# Patient Record
Sex: FEMALE | Race: BLACK OR AFRICAN AMERICAN | NOT HISPANIC OR LATINO | URBAN - METROPOLITAN AREA
[De-identification: names, ages, dates, MRNs, and addresses within clinical notes are randomized per-mention and may not be internally consistent; named-entity substitution may affect disease eponyms.]

---

## 2020-09-15 ENCOUNTER — OFFICE VISIT (OUTPATIENT)
Dept: URBAN - METROPOLITAN AREA CLINIC 25 | Facility: CLINIC | Age: 85
End: 2020-09-15

## 2023-06-04 ENCOUNTER — OUT OF OFFICE VISIT (OUTPATIENT)
Dept: URBAN - METROPOLITAN AREA MEDICAL CENTER 8 | Facility: MEDICAL CENTER | Age: 88
End: 2023-06-04

## 2023-06-04 ENCOUNTER — CLAIMS CREATED FROM THE CLAIM WINDOW (OUTPATIENT)
Dept: URBAN - METROPOLITAN AREA MEDICAL CENTER 8 | Facility: MEDICAL CENTER | Age: 88
End: 2023-06-04
Payer: MEDICARE

## 2023-06-04 DIAGNOSIS — N18.6 ANEMIA DUE TO CHRONIC KIDNEY DISEASE, ON CHRONIC DIALYSIS: ICD-10-CM

## 2023-06-04 DIAGNOSIS — D64.89 ANEMIA DUE TO OTHER CAUSE: ICD-10-CM

## 2023-06-04 DIAGNOSIS — I10 ACCELERATED ESSENTIAL HYPERTENSION: ICD-10-CM

## 2023-06-04 DIAGNOSIS — R19.5 ABNORMAL CONSISTENCY OF STOOL: ICD-10-CM

## 2023-06-04 PROCEDURE — G8427 DOCREV CUR MEDS BY ELIG CLIN: HCPCS | Performed by: INTERNAL MEDICINE

## 2023-06-04 PROCEDURE — 99222 1ST HOSP IP/OBS MODERATE 55: CPT | Performed by: INTERNAL MEDICINE

## 2023-06-05 ENCOUNTER — OUT OF OFFICE VISIT (OUTPATIENT)
Dept: URBAN - METROPOLITAN AREA MEDICAL CENTER 8 | Facility: MEDICAL CENTER | Age: 88
End: 2023-06-05

## 2023-06-05 ENCOUNTER — CLAIMS CREATED FROM THE CLAIM WINDOW (OUTPATIENT)
Dept: URBAN - METROPOLITAN AREA MEDICAL CENTER 8 | Facility: MEDICAL CENTER | Age: 88
End: 2023-06-05
Payer: MEDICARE

## 2023-06-05 DIAGNOSIS — K31.819 ACQUIRED ARTERIOVENOUS MALFORMATION OF DUODENUM: ICD-10-CM

## 2023-06-05 DIAGNOSIS — K92.1 ACUTE MELENA: ICD-10-CM

## 2023-06-05 PROCEDURE — 44369 SMALL BOWEL ENDOSCOPY: CPT | Performed by: INTERNAL MEDICINE

## 2023-06-06 ENCOUNTER — CLAIMS CREATED FROM THE CLAIM WINDOW (OUTPATIENT)
Dept: URBAN - METROPOLITAN AREA MEDICAL CENTER 8 | Facility: MEDICAL CENTER | Age: 88
End: 2023-06-06

## 2023-06-06 ENCOUNTER — CLAIMS CREATED FROM THE CLAIM WINDOW (OUTPATIENT)
Dept: URBAN - METROPOLITAN AREA MEDICAL CENTER 8 | Facility: MEDICAL CENTER | Age: 88
End: 2023-06-06
Payer: MEDICARE

## 2023-06-06 DIAGNOSIS — K92.1 ACUTE MELENA: ICD-10-CM

## 2023-06-06 DIAGNOSIS — K31.819 ACQUIRED ARTERIOVENOUS MALFORMATION OF DUODENUM: ICD-10-CM

## 2023-06-06 DIAGNOSIS — D64.89 ANEMIA DUE TO OTHER CAUSE: ICD-10-CM

## 2023-06-06 PROCEDURE — 99232 SBSQ HOSP IP/OBS MODERATE 35: CPT | Performed by: INTERNAL MEDICINE

## 2023-07-13 ENCOUNTER — OFFICE VISIT (OUTPATIENT)
Dept: URBAN - METROPOLITAN AREA CLINIC 84 | Facility: CLINIC | Age: 88
End: 2023-07-13

## 2023-07-13 RX ORDER — SEVELAMER CARBONATE 800 MG/1
TABLET, FILM COATED ORAL
Qty: 0 | Refills: 0 | COMMUNITY
Start: 1900-01-01

## 2023-07-13 RX ORDER — METOPROLOL SUCCINATE 100 MG/1
TABLET, EXTENDED RELEASE ORAL
Qty: 0 | Refills: 0 | COMMUNITY
Start: 1900-01-01

## 2023-07-13 RX ORDER — VALSARTAN AND HYDROCHLOROTHIAZIDE 80; 12.5 MG/1; MG/1
TABLET, FILM COATED ORAL
Qty: 0 | Refills: 0 | COMMUNITY
Start: 1900-01-01

## 2023-07-13 RX ORDER — LEVOTHYROXINE SODIUM 0.1 MG/1
TABLET ORAL
Qty: 0 | Refills: 0 | COMMUNITY
Start: 1900-01-01

## 2023-07-13 RX ORDER — LOSARTAN POTASSIUM AND HYDROCHLOROTHIAZIDE 100; 25 MG/1; MG/1
TABLET, FILM COATED ORAL
Qty: 0 | Refills: 0 | COMMUNITY
Start: 2016-08-15

## 2024-05-02 ENCOUNTER — DASHBOARD ENCOUNTERS (OUTPATIENT)
Age: 89
End: 2024-05-02

## 2024-05-02 ENCOUNTER — OFFICE VISIT (OUTPATIENT)
Dept: URBAN - METROPOLITAN AREA CLINIC 84 | Facility: CLINIC | Age: 89
End: 2024-05-02
Payer: MEDICARE

## 2024-05-02 VITALS
TEMPERATURE: 98.1 F | BODY MASS INDEX: 24.39 KG/M2 | WEIGHT: 146.4 LBS | HEART RATE: 58 BPM | DIASTOLIC BLOOD PRESSURE: 58 MMHG | SYSTOLIC BLOOD PRESSURE: 186 MMHG | HEIGHT: 65 IN

## 2024-05-02 DIAGNOSIS — K86.9 PANCREATIC LESION: ICD-10-CM

## 2024-05-02 DIAGNOSIS — R93.5 ABNORMAL CT OF THE ABDOMEN: ICD-10-CM

## 2024-05-02 DIAGNOSIS — Q27.30 AVM (ARTERIOVENOUS MALFORMATION): ICD-10-CM

## 2024-05-02 PROBLEM — 169083003: Status: ACTIVE | Noted: 2024-05-02

## 2024-05-02 PROBLEM — 27550009: Status: ACTIVE | Noted: 2024-05-02

## 2024-05-02 PROCEDURE — 99214 OFFICE O/P EST MOD 30 MIN: CPT | Performed by: INTERNAL MEDICINE

## 2024-05-02 RX ORDER — HYDRALAZINE HYDROCHLORIDE 50 MG/1
TABLET ORAL
Qty: 45 TABLET | Status: ACTIVE | COMMUNITY

## 2024-05-02 RX ORDER — METOPROLOL SUCCINATE 100 MG/1
TABLET, EXTENDED RELEASE ORAL
Qty: 0 | Refills: 0 | Status: ACTIVE | COMMUNITY
Start: 1900-01-01

## 2024-05-02 RX ORDER — SEVELAMER CARBONATE 800 MG/1
TABLET, FILM COATED ORAL
Qty: 0 | Refills: 0 | Status: ACTIVE | COMMUNITY
Start: 1900-01-01

## 2024-05-02 RX ORDER — PREDNISOLONE ACETATE 10 MG/ML
INSTILL 1 DROP INTO LEFT EYE FOUR TIMES A DAY 4X/DAY FOR 4 DAYS THEN STOP SUSPENSION/ DROPS OPHTHALMIC
Qty: 5 MILLILITER | Refills: 0 | Status: ACTIVE | COMMUNITY

## 2024-05-02 RX ORDER — CLONIDINE HYDROCHLORIDE 0.2 MG/1
TABLET ORAL
Qty: 270 TABLET | Status: ACTIVE | COMMUNITY

## 2024-05-02 RX ORDER — SODIUM ZIRCONIUM CYCLOSILICATE 5 G/5G
TAKE 1 PACKET BY MOUTH EVERY 7 (SEVEN) DAYS POWDER, FOR SUSPENSION ORAL
Qty: 7 EACH | Refills: 1 | Status: ACTIVE | COMMUNITY

## 2024-05-02 RX ORDER — HYDRALAZINE HYDROCHLORIDE 50 MG/1
TAKE 1 TABLET (50 MG) BY MOUTH IN THE MORNING 1 TABLET IN THE EVENING AND 1 TABLET BEFORE BEDTIME TABLET, FILM COATED ORAL
Qty: 270 EACH | Refills: 0 | Status: ACTIVE | COMMUNITY

## 2024-05-02 RX ORDER — PREDNISOLONE ACETATE 10 MG/ML
SUSPENSION/ DROPS OPHTHALMIC
Qty: 5 MILLILITER | Status: ACTIVE | COMMUNITY

## 2024-05-02 RX ORDER — FOLIC ACID/VIT B COMPLEX AND C 0.8 MG
TAKE 1 TABLET BY MOUTH 1 TIME EACH DAY TABLET ORAL
Qty: 90 EACH | Refills: 0 | Status: ACTIVE | COMMUNITY

## 2024-05-02 RX ORDER — CYCLOSPORINE 0.5 MG/ML
INSTILL 1 DROP INTO BOTH EYES TWICE A DAY EMULSION OPHTHALMIC
Qty: 180 EACH | Refills: 0 | Status: ACTIVE | COMMUNITY

## 2024-05-02 RX ORDER — LEVOTHYROXINE SODIUM 0.1 MG/1
TABLET ORAL
Qty: 0 | Refills: 0 | Status: ACTIVE | COMMUNITY
Start: 1900-01-01

## 2024-05-02 RX ORDER — VALSARTAN 160 MG/1
TAKE 1 TABLET (160 MG TOTAL) BY MOUTH IN THE MORNING AND 1 TABLET (160 MG TOTAL) IN THE EVENING TABLET, FILM COATED ORAL
Qty: 180 EACH | Refills: 0 | Status: ACTIVE | COMMUNITY

## 2024-05-02 RX ORDER — VALSARTAN AND HYDROCHLOROTHIAZIDE 80; 12.5 MG/1; MG/1
TABLET, FILM COATED ORAL
Qty: 0 | Refills: 0 | Status: ACTIVE | COMMUNITY
Start: 1900-01-01

## 2024-05-02 RX ORDER — CARVEDILOL 25 MG/1
TAKE 1 TABLET BY MOUTH EVERY DAY IN THE MORNING AND IN THE EVENING WITH MEALS TABLET, FILM COATED ORAL
Qty: 180 EACH | Refills: 0 | Status: ACTIVE | COMMUNITY

## 2024-05-02 RX ORDER — AMLODIPINE BESYLATE 5 MG/1
TABLET ORAL
Qty: 60 TABLET | Status: ACTIVE | COMMUNITY

## 2024-05-02 RX ORDER — LOSARTAN POTASSIUM AND HYDROCHLOROTHIAZIDE 100; 25 MG/1; MG/1
TABLET, FILM COATED ORAL
Qty: 0 | Refills: 0 | Status: ACTIVE | COMMUNITY
Start: 2016-08-15

## 2024-07-25 ENCOUNTER — TELEPHONE ENCOUNTER (OUTPATIENT)
Dept: URBAN - METROPOLITAN AREA CLINIC 84 | Facility: CLINIC | Age: 89
End: 2024-07-25

## 2024-08-22 ENCOUNTER — TELEPHONE ENCOUNTER (OUTPATIENT)
Dept: URBAN - METROPOLITAN AREA CLINIC 84 | Facility: CLINIC | Age: 89
End: 2024-08-22

## 2024-09-12 ENCOUNTER — OFFICE VISIT (OUTPATIENT)
Dept: URBAN - METROPOLITAN AREA CLINIC 84 | Facility: CLINIC | Age: 89
End: 2024-09-12
Payer: MEDICARE

## 2024-09-12 VITALS
DIASTOLIC BLOOD PRESSURE: 61 MMHG | BODY MASS INDEX: 24.36 KG/M2 | TEMPERATURE: 97.1 F | WEIGHT: 146.2 LBS | HEIGHT: 65 IN | HEART RATE: 66 BPM | SYSTOLIC BLOOD PRESSURE: 172 MMHG

## 2024-09-12 DIAGNOSIS — D49.0 IPMN (INTRADUCTAL PAPILLARY MUCINOUS NEOPLASM): ICD-10-CM

## 2024-09-12 DIAGNOSIS — R93.5 ABNORMAL MRI OF ABDOMEN: ICD-10-CM

## 2024-09-12 DIAGNOSIS — K86.9 PANCREATIC LESION: ICD-10-CM

## 2024-09-12 DIAGNOSIS — Q27.30 AVM (ARTERIOVENOUS MALFORMATION): ICD-10-CM

## 2024-09-12 PROCEDURE — 99214 OFFICE O/P EST MOD 30 MIN: CPT | Performed by: INTERNAL MEDICINE

## 2024-09-12 RX ORDER — SODIUM ZIRCONIUM CYCLOSILICATE 5 G/5G
TAKE 1 PACKET BY MOUTH EVERY 7 (SEVEN) DAYS POWDER, FOR SUSPENSION ORAL
Qty: 7 EACH | Refills: 1 | Status: ACTIVE | COMMUNITY

## 2024-09-12 RX ORDER — LEVOTHYROXINE SODIUM 0.1 MG/1
TABLET ORAL
Qty: 0 | Refills: 0 | Status: ACTIVE | COMMUNITY
Start: 1900-01-01

## 2024-09-12 RX ORDER — HYDRALAZINE HYDROCHLORIDE 50 MG/1
TAKE 1 TABLET (50 MG) BY MOUTH IN THE MORNING 1 TABLET IN THE EVENING AND 1 TABLET BEFORE BEDTIME TABLET, FILM COATED ORAL
Qty: 270 EACH | Refills: 0 | Status: ACTIVE | COMMUNITY

## 2024-09-12 RX ORDER — CARVEDILOL 25 MG/1
TAKE 1 TABLET BY MOUTH EVERY DAY IN THE MORNING AND IN THE EVENING WITH MEALS TABLET, FILM COATED ORAL
Qty: 180 EACH | Refills: 0 | Status: ACTIVE | COMMUNITY

## 2024-09-12 RX ORDER — PREDNISOLONE ACETATE 10 MG/ML
INSTILL 1 DROP INTO LEFT EYE FOUR TIMES A DAY 4X/DAY FOR 4 DAYS THEN STOP SUSPENSION/ DROPS OPHTHALMIC
Qty: 5 MILLILITER | Refills: 0 | Status: ACTIVE | COMMUNITY

## 2024-09-12 RX ORDER — VALSARTAN AND HYDROCHLOROTHIAZIDE 80; 12.5 MG/1; MG/1
TABLET, FILM COATED ORAL
Qty: 0 | Refills: 0 | Status: ACTIVE | COMMUNITY
Start: 1900-01-01

## 2024-09-12 RX ORDER — HYDRALAZINE HYDROCHLORIDE 50 MG/1
TABLET ORAL
Qty: 45 TABLET | Status: ACTIVE | COMMUNITY

## 2024-09-12 RX ORDER — VALSARTAN 160 MG/1
TAKE 1 TABLET (160 MG TOTAL) BY MOUTH IN THE MORNING AND 1 TABLET (160 MG TOTAL) IN THE EVENING TABLET, FILM COATED ORAL
Qty: 180 EACH | Refills: 0 | Status: ACTIVE | COMMUNITY

## 2024-09-12 RX ORDER — SEVELAMER CARBONATE 800 MG/1
TABLET, FILM COATED ORAL
Qty: 0 | Refills: 0 | Status: ACTIVE | COMMUNITY
Start: 1900-01-01

## 2024-09-12 RX ORDER — AMLODIPINE BESYLATE 5 MG/1
TABLET ORAL
Qty: 60 TABLET | Status: ACTIVE | COMMUNITY

## 2024-09-12 RX ORDER — PREDNISOLONE ACETATE 10 MG/ML
SUSPENSION/ DROPS OPHTHALMIC
Qty: 5 MILLILITER | Status: ACTIVE | COMMUNITY

## 2024-09-12 RX ORDER — CYCLOSPORINE 0.5 MG/ML
INSTILL 1 DROP INTO BOTH EYES TWICE A DAY EMULSION OPHTHALMIC
Qty: 180 EACH | Refills: 0 | Status: ACTIVE | COMMUNITY

## 2024-09-12 RX ORDER — CLONIDINE HYDROCHLORIDE 0.2 MG/1
TABLET ORAL
Qty: 270 TABLET | Status: ACTIVE | COMMUNITY

## 2024-09-12 RX ORDER — LOSARTAN POTASSIUM AND HYDROCHLOROTHIAZIDE 100; 25 MG/1; MG/1
TABLET, FILM COATED ORAL
Qty: 0 | Refills: 0 | Status: ACTIVE | COMMUNITY
Start: 2016-08-15

## 2024-09-12 RX ORDER — METOPROLOL SUCCINATE 100 MG/1
TABLET, EXTENDED RELEASE ORAL
Qty: 0 | Refills: 0 | Status: ACTIVE | COMMUNITY
Start: 1900-01-01

## 2024-09-12 RX ORDER — FOLIC ACID/VIT B COMPLEX AND C 0.8 MG
TAKE 1 TABLET BY MOUTH 1 TIME EACH DAY TABLET ORAL
Qty: 90 EACH | Refills: 0 | Status: ACTIVE | COMMUNITY

## 2024-09-12 NOTE — HPI-TODAY'S VISIT:
MRI showed pancreatic cyst that is getting bigger.  It is now 3.5 cm.  There are 2 smaller cysts present.  The Radiologist believes that this is an enlarging IPMN.  I explained to the patient that at this point we would typically recommend EUS with FNA, but given her advanced age we would have to have further discussion about this.  She is here today with family memebers to discuss this.  Overall she feels well.  She denies anorexia or weight loss.  She denies abdominal pain.  She denies UGI symptoms.  She denies LGI symptoms.  She denies LGI Bleed or melena